# Patient Record
Sex: MALE | Race: ASIAN | NOT HISPANIC OR LATINO | URBAN - METROPOLITAN AREA
[De-identification: names, ages, dates, MRNs, and addresses within clinical notes are randomized per-mention and may not be internally consistent; named-entity substitution may affect disease eponyms.]

---

## 2017-02-08 ENCOUNTER — ALLSCRIPTS OFFICE VISIT (OUTPATIENT)
Dept: OTHER | Facility: OTHER | Age: 22
End: 2017-02-08

## 2017-05-10 ENCOUNTER — ALLSCRIPTS OFFICE VISIT (OUTPATIENT)
Dept: OTHER | Facility: OTHER | Age: 22
End: 2017-05-10

## 2017-11-03 ENCOUNTER — ALLSCRIPTS OFFICE VISIT (OUTPATIENT)
Dept: OTHER | Facility: OTHER | Age: 22
End: 2017-11-03

## 2017-11-04 NOTE — PROGRESS NOTES
Assessment  1  ADHD (attention deficit hyperactivity disorder), combined type (314 01) (F90 2)   2  Short attention span (799 51) (R41 840)   3  Caffeine use (V49 89) (F15 90)   4  History of Oral Surgery Tooth Extraction [place] [facility]   5  Influenza vaccine needed (V04 81) (Z23)    Plan  ADHD (attention deficit hyperactivity disorder), combined type    · From  Amphetamine-Dextroamphet ER 20 MG Oral Capsule Extended Release 24 Hour  TAKE 1 CAPSULE DAILY EVERY MORNING To Amphetamine-Dextroamphet ER 10 MG Oral Capsule  Extended Release 24 Hour take 1 capsule daily  ADHD (attention deficit hyperactivity disorder), combined type, Influenza vaccine needed    · Follow-up visit in 2 months Evaluation and Treatment  Follow-up  Status: Hold For - Scheduling   Requested for: [id number]  Influenza vaccine needed    · Stop: Fluzone Quadrivalent 0 5 ML Intramuscular Suspension Prefilled Syringe    Discussion/Summary  Discussion Summary:   Doing well  dose 10 mg daily  then caroline off  Counseling Documentation With Imm: The patient was counseled regarding instructions for management,-- risk factor reductions,-- prognosis  Chief Complaint  Chief Complaint Free Text Note Form: 6 month F/U for ADHD, Short attention span  History of Present Illness  ADHD (Follow-Up): The patient's ADHD subtype is the predominantly inattentive type  His ADHD has been well controlled since the last visit  He has no comorbid illnesses  He has had no significant interval events  Target Symptoms:   1  Hyperactive behavior has resolved  2  Impulsive behavior has resolved  3  Difficulty concentrating has improved  Symptoms are typically worse in the morning  The patient takes his medications all of the time  Medication side effects include no anorexia,-- no headache,-- no tics,-- no irritability,-- no weight loss,-- no abdominal pain-- and-- no nausea  The side effects are described as tolerable        Review of Systems  Complete-Male: Constitutional: No fever or chills, feels well, no tiredness, no recent weight gain or weight loss  Eyes: No complaints of eye pain, no red eyes, no discharge from eyes, no itchy eyes  ENT: no complaints of earache, no hearing loss, no nosebleeds, no nasal discharge, no sore throat, no hoarseness  Cardiovascular: No complaints of slow heart rate, no fast heart rate, no chest pain, no palpitations, no leg claudication, no lower extremity  Respiratory: No complaints of shortness of breath, no wheezing, no cough, no SOB on exertion, no orthopnea or PND  Gastrointestinal: No complaints of abdominal pain, no constipation, no nausea or vomiting, no diarrhea or bloody stools  Genitourinary: No complaints of dysuria, no incontinence, no hesitancy, no nocturia, no genital lesion, no testicular pain  Musculoskeletal: No complaints of arthralgia, no myalgias, no joint swelling or stiffness, no limb pain or swelling  Neurological: No compliants of headache, no confusion, no convulsions, no numbness or tingling, no dizziness or fainting, no limb weakness, no difficulty walking  Psychiatric: Is not suicidal, no sleep disturbances, no anxiety or depression, no change in personality, no emotional problems  Endocrine: No complaints of proptosis, no hot flashes, no muscle weakness, no erectile dysfunction, no deepening of the voice, no feelings of weakness  Hematologic/Lymphatic: No complaints of swollen glands, no swollen glands in the neck, does not bleed easily, no easy bruising  Active Problems  1  ADHD (attention deficit hyperactivity disorder), combined type (314 01) (F90 2)   2  Advance directive in chart (V49 89) (Z78 9)   3  Short attention span (799 51) (R41 840)    Past Medical History  1  History of No significant past medical history  Active Problems And Past Medical History Reviewed: The active problems and past medical history were reviewed and updated today  Surgical History  1  Denied: History Of Prior Surgery   2  History of Oral Surgery Tooth Extraction [place] [facility]  Surgical History Reviewed: The surgical history was reviewed and updated today  Family History  Mother    1  No pertinent family history  Father    2  No pertinent family history  Family History Reviewed: The family history was reviewed and updated today  Social History   · Advance directive in chart (V49 89) (Z78 9)   · Caffeine use (V49 89) (F15 90)   · Never a smoker   · No alcohol use   · No illicit drug use  Social History Reviewed: The social history was reviewed and updated today  Current Meds   1  Amphetamine-Dextroamphet ER 20 MG Oral Capsule Extended Release 24 Hour; TAKE 1 CAPSULE   DAILY EVERY MORNING; Therapy: [id number] to (Evaluate:16Nov2017); Last Rx:17Oct2017 Ordered  Medication List Reviewed: The medication list was reviewed and updated today  Allergies  1  No Known Drug Allergies  2  No Known Environmental Allergies   3  No Known Food Allergies    Vitals  Vital Signs    Recorded: 67CSK2546 09:51AM   Temperature 97 4 F, Oral   Heart Rate 94   Systolic 032, LUE, Sitting   Diastolic 70, LUE, Sitting   Height 5 ft 8 62 in   Weight 129 lb 6 4 oz   BMI Calculated 19 32   BSA Calculated 1 71   O2 Saturation 99, RA     Physical Exam    Constitutional   General appearance: No acute distress, well appearing and well nourished  Eyes   Conjunctiva and lids: No swelling, erythema, or discharge  Ears, Nose, Mouth, and Throat   Otoscopic examination: Tympanic membrance translucent with normal light reflex  Canals patent without erythema  Oropharynx: Normal with no erythema, edema, exudate or lesions  Pulmonary   Respiratory effort: No increased work of breathing or signs of respiratory distress  Auscultation of lungs: Clear to auscultation, equal breath sounds bilaterally, no wheezes, no rales, no rhonci  Cardiovascular   Palpation of heart: Normal PMI, no thrills  Auscultation of heart: Normal rate and rhythm, normal S1 and S2, without murmurs  Carotid pulses: Normal     Abdomen   Abdomen: Non-tender, no masses  Musculoskeletal   Gait and station: Normal     Digits and nails: Normal without clubbing or cyanosis  Neurologic   Cranial nerves: Cranial nerves 2-12 intact  Psychiatric   Orientation to person, place and time: Normal     Mood and affect: Normal          Health Management  History of Depression screening   PHevery-2 Adult Depression Screening; every 1 year; Last [id number]; Next Due: 42HRI4064; Active  PHevery-9 Adult Depression Screening; every 1 year; Last 45Aad2557; Next Due: 79Ftz0835; Overdue    Signatures   Electronically signed by :  Florette Rubinstein, MD; Nov  3 2017 10:20AM EST                       (Author)

## 2018-01-12 VITALS
DIASTOLIC BLOOD PRESSURE: 70 MMHG | SYSTOLIC BLOOD PRESSURE: 98 MMHG | WEIGHT: 134.25 LBS | HEART RATE: 100 BPM | HEIGHT: 69 IN | BODY MASS INDEX: 19.88 KG/M2 | TEMPERATURE: 98.5 F | OXYGEN SATURATION: 98 %

## 2018-01-12 NOTE — PROGRESS NOTES
Assessment   1  Short attention span (799 51) (R46 821)  2  ADHD (attention deficit hyperactivity disorder), combined type (314 01) (F90 2)1      1 Amended By: Luis Mixon; Feb 05 2016 10:33 AM EST    Plan  Health Maintenance, Short attention span    · (1) BASIC METABOLIC PROFILE; Status:Active; Requested for:21Pef0150;    · (1) CBC/ PLT (NO DIFF); Status:Active; Requested for:92Jmw9294;    · (1) TSH; Status:Active; Requested for:24Vfk8557;    · Follow-up visit in 1 month Evaluation and Treatment  Follow-up  Status: Hold For -  Scheduling  Requested for: 42LPH0136  Need for prophylactic vaccination and inoculation against influenza    · Temporarily Stop: Fluzone Quadrivalent Intramuscular Suspension  Short attention span    · Start: Amphetamine-Dextroamphetamine 10 MG Oral Tablet; TAKE 1 TABLET DAILY    Discussion/Summary    See med list    The patient was counseled regarding diagnostic results, instructions for management, risk factor reductions, prognosis  Chief Complaint  PT STATES SHORT ATTENTION SPAN AND OTHER PERSONAL ISSUES TO TALK ABOUT WITH THE DR      History of Present Illness  ADHD/ADD: The patient is being seen for an initial evaluation of attention deficit hyperactivity disorder  Symptoms:  short attention span, easy distractibility, poor academic performance, poor work performance, forgetfulness and disorganization, but no impulsive behavior, no hyperactive behavior, no poor social skills, no poor listening, no restlessness, no losing things and no excessive talking  The patient is currently experiencing symptoms  No associated symptoms are reported  Review of Systems    Constitutional: no fever or chills, feels well, no tiredness, no recent weight loss or weight gain  ENT: no complaints of earache, no loss of hearing, no nosebleeds or nasal discharge, no sore throat or hoarseness     Cardiovascular: no complaints of slow or fast heart rate, no chest pain, no palpitations, no leg claudication or lower extremity edema  Respiratory: no complaints of shortness of breath, no wheezing or cough, no dyspnea on exertion, no orthopnea or PND  Gastrointestinal: no complaints of abdominal pain, no constipation, no nausea or vomiting, no diarrhea or bloody stools  Musculoskeletal: no complaints of arthralgia, no myalgia, no joint swelling or stiffness, no limb pain or swelling  Neurological: no complaints of headache, no confusion, no numbness or tingling, no dizziness or fainting  Active Problems   1  Need for prophylactic vaccination and inoculation against influenza (V04 81) (Z23)  2  Short attention span (799 51) (R41 840)    Past Medical History  Active Problems And Past Medical History Reviewed: The active problems and past medical history were reviewed and updated today  Social History    · Never a smoker   · No alcohol use   · No drug use  The social history was reviewed and updated today  Family History  Family History Reviewed: The family history was reviewed and updated today  Current Meds  1  No Reported Medications Recorded    The medication list was reviewed and updated today  Allergies   1  No Known Drug Allergies    Vitals   Recorded: 71BDX9208 11:05AM   Temperature 98 1 F, Oral   Heart Rate 83   Systolic 498, LUE   Diastolic 64, LUE   Height 5 ft 9 in   Weight 142 lb 4 oz   BMI Calculated 21 01   BSA Calculated 1 79   O2 Saturation 99     Physical Exam    Constitutional   General appearance: No acute distress, well appearing and well nourished  Eyes   Conjunctiva and lids: No swelling, erythema, or discharge  Ears, Nose, Mouth, and Throat   Otoscopic examination: Tympanic membrance translucent with normal light reflex  Canals patent without erythema  Oropharynx: Normal with no erythema, edema, exudate or lesions  Pulmonary   Respiratory effort: No increased work of breathing or signs of respiratory distress      Cardiovascular   Palpation of heart: Normal PMI, no thrills  Auscultation of heart: Normal rate and rhythm, normal S1 and S2, without murmurs  Carotid pulses: Normal     Neurologic   Cranial nerves: Cranial nerves 2-12 intact  Reflexes: 2+ and symmetric  Sensation: No sensory loss  Psychiatric   Orientation to person, place and time: Normal     Mood and affect: Normal          Signatures   Electronically signed by :  Per Barragan MD; Feb 5 2016 10:34AM EST                       (Author)

## 2018-01-13 VITALS
BODY MASS INDEX: 19.16 KG/M2 | OXYGEN SATURATION: 99 % | HEIGHT: 69 IN | WEIGHT: 129.4 LBS | HEART RATE: 94 BPM | SYSTOLIC BLOOD PRESSURE: 104 MMHG | DIASTOLIC BLOOD PRESSURE: 70 MMHG | TEMPERATURE: 97.4 F

## 2018-01-14 VITALS
HEART RATE: 124 BPM | OXYGEN SATURATION: 98 % | BODY MASS INDEX: 20.31 KG/M2 | HEIGHT: 69 IN | WEIGHT: 137.13 LBS | SYSTOLIC BLOOD PRESSURE: 110 MMHG | DIASTOLIC BLOOD PRESSURE: 72 MMHG | TEMPERATURE: 98.9 F

## 2018-12-15 DIAGNOSIS — F90.9 ATTENTION DEFICIT HYPERACTIVITY DISORDER (ADHD), UNSPECIFIED ADHD TYPE: Primary | ICD-10-CM

## 2018-12-15 RX ORDER — DEXTROAMPHETAMINE SACCHARATE, AMPHETAMINE ASPARTATE, DEXTROAMPHETAMINE SULFATE AND AMPHETAMINE SULFATE 5; 5; 5; 5 MG/1; MG/1; MG/1; MG/1
20 TABLET ORAL DAILY
Qty: 6 TABLET | Refills: 0 | Status: SHIPPED | OUTPATIENT
Start: 2018-12-15 | End: 2018-12-17 | Stop reason: CLARIF

## 2021-04-07 ENCOUNTER — TELEPHONE (OUTPATIENT)
Dept: INTERNAL MEDICINE CLINIC | Facility: CLINIC | Age: 26
End: 2021-04-07

## 2021-04-09 NOTE — TELEPHONE ENCOUNTER
Dr Amber Merchant he will be a new patient to the office  Last time seen 01/2018  Is it ok to do just a 15 minute appointment with you?

## 2021-04-09 NOTE — TELEPHONE ENCOUNTER
Patient called looking to set up an appointment with you within the next couple weeks  Your schedule is booked unfortunately    Is there a day that you would be able to fit patient in?

## 2021-09-21 ENCOUNTER — NURSE TRIAGE (OUTPATIENT)
Dept: OTHER | Facility: OTHER | Age: 26
End: 2021-09-21

## 2021-09-21 DIAGNOSIS — Z11.59 SPECIAL SCREENING EXAMINATION FOR VIRAL DISEASE: Primary | ICD-10-CM

## 2021-09-22 NOTE — TELEPHONE ENCOUNTER
Reason for Disposition   Health Information question, no triage required and triager able to answer question    Answer Assessment - Initial Assessment Questions  1   REASON FOR CALL or QUESTION: "What is your reason for calling today?" or "How can I best help you?" or "What question do you have that I can help answer?"      Covid travel    Protocols used: INFORMATION ONLY CALL - NO TRIAGE-ADULT-

## 2021-09-22 NOTE — TELEPHONE ENCOUNTER
Regardin/2 Catrina / Travel Swab  ----- Message from [facility] sent at 2021  6:45 PM EDT -----  "I need to get two travel swabs "

## 2021-09-23 LAB — SARS-COV-2 RNA RESP QL NAA+PROBE: NEGATIVE

## 2022-06-14 ENCOUNTER — OFFICE VISIT (OUTPATIENT)
Dept: INTERNAL MEDICINE CLINIC | Facility: OTHER | Age: 27
End: 2022-06-14
Payer: COMMERCIAL

## 2022-06-14 VITALS
HEART RATE: 116 BPM | OXYGEN SATURATION: 98 % | BODY MASS INDEX: 22.42 KG/M2 | DIASTOLIC BLOOD PRESSURE: 82 MMHG | TEMPERATURE: 99.1 F | SYSTOLIC BLOOD PRESSURE: 118 MMHG | WEIGHT: 151.4 LBS | HEIGHT: 69 IN

## 2022-06-14 DIAGNOSIS — J30.9 CHRONIC ALLERGIC RHINITIS: ICD-10-CM

## 2022-06-14 DIAGNOSIS — F98.8 ATTENTION DEFICIT DISORDER, UNSPECIFIED HYPERACTIVITY PRESENCE: ICD-10-CM

## 2022-06-14 DIAGNOSIS — H65.491 CHRONIC MIDDLE EAR EFFUSION, RIGHT: Primary | ICD-10-CM

## 2022-06-14 PROCEDURE — 99203 OFFICE O/P NEW LOW 30 MIN: CPT | Performed by: INTERNAL MEDICINE

## 2022-06-14 PROCEDURE — 3725F SCREEN DEPRESSION PERFORMED: CPT | Performed by: INTERNAL MEDICINE

## 2022-06-14 PROCEDURE — 3008F BODY MASS INDEX DOCD: CPT | Performed by: INTERNAL MEDICINE

## 2022-06-14 PROCEDURE — 1036F TOBACCO NON-USER: CPT | Performed by: INTERNAL MEDICINE

## 2022-06-14 RX ORDER — PREDNISONE 20 MG/1
40 TABLET ORAL DAILY
Qty: 10 TABLET | Refills: 0 | Status: SHIPPED | OUTPATIENT
Start: 2022-06-14 | End: 2022-06-19

## 2022-06-14 RX ORDER — DEXTROAMPHETAMINE SACCHARATE, AMPHETAMINE ASPARTATE MONOHYDRATE, DEXTROAMPHETAMINE SULFATE AND AMPHETAMINE SULFATE 5; 5; 5; 5 MG/1; MG/1; MG/1; MG/1
1 CAPSULE, EXTENDED RELEASE ORAL DAILY
COMMUNITY
Start: 2022-06-06

## 2022-06-14 NOTE — PROGRESS NOTES
Assessment/Plan:    1  Right middle ear effusion  Will treat with prednisone 40 mg daily for 5 days  2  Chronic allergic rhinitis  Advised to use over-the-counter Flonase or Nasacort nasal spray  Antihistamine either Allegra 180 mg or Claritin 10 mg daily  Also advised for steam inhalation  Will re-evaluate in about 6 week if no significant improvement probably will refer to ENT           Diagnoses and all orders for this visit:    Chronic middle ear effusion, right  -     predniSONE 20 mg tablet; Take 2 tablets (40 mg total) by mouth daily for 5 days    Chronic allergic rhinitis  -     predniSONE 20 mg tablet; Take 2 tablets (40 mg total) by mouth daily for 5 days    Attention deficit disorder, unspecified hyperactivity presence    Other orders  -     amphetamine-dextroamphetamine (ADDERALL XR) 20 MG 24 hr capsule; Take 1 capsule by mouth daily On the weekdays            Depression Screening and Follow-up Plan: Patient was screened for depression during today's encounter  They screened negative with a PHQ-2 score of 0  Subjective:          Patient ID: Willis Fernandez is a 31 y o  male  Came to office with complaint of pressure/fullness of right ear  Also with some nasal congestion going on for the last few months  And also noticed the hearing loss  The following portions of the patient's history were reviewed and updated as appropriate: allergies, current medications, past family history, past medical history, past social history, past surgical history and problem list     Review of Systems   Constitutional: Negative for fatigue and fever  HENT: Positive for congestion, ear pain and hearing loss  Negative for ear discharge, postnasal drip, sinus pressure, sore throat, tinnitus and trouble swallowing  Eyes: Negative for discharge, itching and visual disturbance  Respiratory: Negative for cough and shortness of breath  Cardiovascular: Negative for chest pain and palpitations  Gastrointestinal: Negative for abdominal pain, diarrhea, nausea and vomiting  Endocrine: Negative for cold intolerance and polyuria  Genitourinary: Negative for difficulty urinating, dysuria and urgency  Musculoskeletal: Negative for arthralgias and neck pain  Skin: Negative for rash  Allergic/Immunologic: Negative for environmental allergies  Neurological: Negative for dizziness, weakness and headaches  Psychiatric/Behavioral: The patient is not nervous/anxious  Past Medical History:   Diagnosis Date    Otitis media          Current Outpatient Medications:     predniSONE 20 mg tablet, Take 2 tablets (40 mg total) by mouth daily for 5 days, Disp: 10 tablet, Rfl: 0    amphetamine-dextroamphetamine (ADDERALL XR) 20 MG 24 hr capsule, Take 1 capsule by mouth daily On the weekdays, Disp: , Rfl:     No Known Allergies    Social History   History reviewed  No pertinent surgical history  Family History   Problem Relation Age of Onset    No Known Problems Mother     No Known Problems Father     No Known Problems Sister     No Known Problems Brother     Diabetes Maternal Grandmother        Objective:  /82 (BP Location: Left arm, Patient Position: Sitting, Cuff Size: Adult)   Pulse (!) 116   Temp 99 1 °F (37 3 °C)   Ht 5' 9" (1 753 m)   Wt 68 7 kg (151 lb 6 4 oz)   SpO2 98%   BMI 22 36 kg/m²   Body mass index is 22 36 kg/m²  Physical Exam  Constitutional:       Appearance: He is well-developed  HENT:      Head: Normocephalic  Right Ear: Ear canal and external ear normal       Left Ear: Tympanic membrane, ear canal and external ear normal       Ears:      Comments: Moderate right middle ear effusions noted  Tympanic membrane isn't bulging  No sign of infection     Nose: Congestion present  Comments: Bilateral nasal turbinates are hyperemic and hypertrophied     Mouth/Throat:      Pharynx: No posterior oropharyngeal erythema     Eyes:      General: No scleral icterus  Pupils: Pupils are equal, round, and reactive to light  Neck:      Thyroid: No thyromegaly  Trachea: No tracheal deviation  Cardiovascular:      Rate and Rhythm: Normal rate and regular rhythm  Heart sounds: Normal heart sounds  No murmur heard  Pulmonary:      Effort: Pulmonary effort is normal  No respiratory distress  Breath sounds: Normal breath sounds  Chest:      Chest wall: No tenderness  Abdominal:      General: Bowel sounds are normal       Palpations: Abdomen is soft  There is no mass  Tenderness: There is no abdominal tenderness  Musculoskeletal:         General: Normal range of motion  Cervical back: Normal range of motion and neck supple  Right lower leg: No edema  Left lower leg: No edema  Lymphadenopathy:      Cervical: No cervical adenopathy  Skin:     General: Skin is warm  Findings: No lesion or rash  Neurological:      Mental Status: He is alert and oriented to person, place, and time  Cranial Nerves: No cranial nerve deficit     Psychiatric:         Mood and Affect: Mood normal          Behavior: Behavior normal